# Patient Record
(demographics unavailable — no encounter records)

---

## 2017-08-25 NOTE — EMERGENCY ROOM VISIT NOTE
History


Report prepared by Lucian:  Buzz Sanford


Under the Supervision of:  Dr. Trino Goncalves D.O.


First contact with patient:  22:01


Chief Complaint:  KIDNEY STONE


Stated Complaint:  POSSIBLE KIDNEY STONE





History of Present Illness


The patient is a 25 year old female who presents to the Emergency Room with 

complaints of right flank pain that began 1 week ago. Her pain began as dull, 

but worsened to more of a sharp pain. She rates her pain a 5/10 in severity. 

She has a past medical history of kidney stones and states that her current 

symptoms feel like those of a kidney stone. She is having some burning with 

urination and an abnormal urinary scent as well. She also has a cold with sinus 

congestion and rhinorrhea. She denies any other abnormal symptoms. Her last 

menstrual period was 1 month ago and states that she could be pregnant.





   Source of History:  patient


   Onset:  1 week ago


   Position:  other (Right flank)


   Symptom Intensity:  5/10


   Quality:  sharp


   Timing:  worsening


   Associated Symptoms:  + urinary symptoms


Note:


She has sinus congestion and rhinorrhea. 


She denies any other abnormal symptoms.





Review of Systems


See HPI for pertinent positives & negatives. A total of 10 systems reviewed and 

were otherwise negative.





Past Medical & Surgical


Medical Problems:


(1) Bronchitis


(2) Kidney stones


(3) Ureterolithiasis








Family History





Cancer


Diabetes mellitus


Gallbladder disease


Heart disease


Hypertension


Kidney disease


Kidney stones





Social History


Smoking Status:  Never Smoker


Alcohol Use:  none


Drug Use:  none


Marital Status:  single, in relationship


Housing Status:  lives with family


Occupation Status:  employed





Current/Historical Medications


No Active Prescriptions or Reported Meds





Allergies


Coded Allergies:  


     Diphenhydramine (Verified  Allergy, Intermediate, hives, 8/25/17)





Physical Exam


Vital Signs











  Date Time  Temp Pulse Resp B/P (MAP) Pulse Ox O2 Delivery O2 Flow Rate FiO2


 


8/25/17 21:58 37.0 86 18 137/91 97 Room Air  











Physical Exam


CONSTITUTIONAL/VITAL SIGNS: Reviewed / noted above.


GENERAL: Non-toxic in appearance. 


INTEGUMENTARY: Warm, dry, and Pink.


HEAD: Normocephalic.


EYES: without scleral icterus or trauma.


ENT/OROPHARYNX: clear and moist.


LYMPHADENOPATHY/NECK: Is supple without lymphadenopathy or meningismus.


RESPIRATORY: Lungs clear and equal.


CARDIOVASCULAR: Regular rate and rhythm.


GI/ABDOMEN: Soft and nontender. No organomegaly or pulsatile mass. No rebound 

or guarding. Normal bowel sounds.


EXTREMITIES: Warm and well perfused.


BACK: Right CVA tenderness to palpation. 


NEUROLOGICAL: Intact without focal deficits. 


PSYCHIATRIC: normal affect.


MUSCULOSKELETAL: Normally developed with good muscle tone.





Medical Decision & Procedures


ER Provider


Diagnostic Interpretation:


Radiology results as stated below per my review and radiologist interpretation:





CT ABDOMEN & PELVIS:





Compared to 1/7/16.





A 3 cm right adnexal cystic structure, likely ovarian.


No evidence of hydronephrosis or ureteral stone.


Minimally thickened underdistended bladder.


Normal caliber appendix.


No evidence of bowel obstruction.


Tiny hepatic hypodensity, unchanged.


Small nonspecific mesenteric lymph nodes.





Radiologist:      Marla Sylvester M.D.





Laboratory Results


8/25/17 22:34








Red Blood Count 5.19, Mean Corpuscular Volume 80.3, Mean Corpuscular Hemoglobin 

26.0, Mean Corpuscular Hemoglobin Concent 32.4, Mean Platelet Volume 9.7, 

Neutrophils (%) (Auto) 55.4, Lymphocytes (%) (Auto) 30.3, Monocytes (%) (Auto) 

9.6, Eosinophils (%) (Auto) 4.3, Basophils (%) (Auto) 0.3, Neutrophils # (Auto) 

4.16, Lymphocytes # (Auto) 2.27, Monocytes # (Auto) 0.72, Eosinophils # (Auto) 

0.32, Basophils # (Auto) 0.02





8/25/17 22:34

















Test


  8/25/17


22:08 8/25/17


22:20 8/25/17


22:34


 


Urine Color  YELLOW  


 


Urine Appearance  CLEAR (CLEAR)  


 


Urine pH  5.0 (4.5-7.5)  


 


Urine Specific Gravity


  


  1.027


(1.000-1.030) 


 


 


Urine Protein  NEG (NEG)  


 


Urine Glucose (UA)  NEG (NEG)  


 


Urine Ketones  NEG (NEG)  


 


Urine Occult Blood  NEG (NEG)  


 


Urine Nitrite  NEG (NEG)  


 


Urine Bilirubin  NEG (NEG)  


 


Urine Urobilinogen  NEG (NEG)  


 


Urine Leukocyte Esterase  TRACE (NEG)  


 


Urine WBC (Auto)


  


  5-10 /hpf


(0-5) 


 


 


Urine RBC (Auto)  0-4 /hpf (0-4)  


 


Urine Hyaline Casts (Auto)  0 /lpf (0-5)  


 


Urine Epithelial Cells (Auto)  >30 /lpf (0-5)  


 


Urine Bacteria (Auto)  1+ (NEG)  


 


Urine Pregnancy Test  NEG (NEG)  


 


White Blood Count


  


  


  7.50 K/uL


(4.8-10.8)


 


Red Blood Count


  


  


  5.19 M/uL


(4.2-5.4)


 


Hemoglobin


  


  


  13.5 g/dL


(12.0-16.0)


 


Hematocrit   41.7 % (37-47) 


 


Mean Corpuscular Volume


  


  


  80.3 fL


()


 


Mean Corpuscular Hemoglobin


  


  


  26.0 pg


(25-34)


 


Mean Corpuscular Hemoglobin


Concent 


  


  32.4 g/dl


(32-36)


 


Platelet Count


  


  


  303 K/uL


(130-400)


 


Mean Platelet Volume


  


  


  9.7 fL


(7.4-10.4)


 


Neutrophils (%) (Auto)   55.4 % 


 


Lymphocytes (%) (Auto)   30.3 % 


 


Monocytes (%) (Auto)   9.6 % 


 


Eosinophils (%) (Auto)   4.3 % 


 


Basophils (%) (Auto)   0.3 % 


 


Neutrophils # (Auto)


  


  


  4.16 K/uL


(1.4-6.5)


 


Lymphocytes # (Auto)


  


  


  2.27 K/uL


(1.2-3.4)


 


Monocytes # (Auto)


  


  


  0.72 K/uL


(0.11-0.59)


 


Eosinophils # (Auto)


  


  


  0.32 K/uL


(0-0.5)


 


Basophils # (Auto)


  


  


  0.02 K/uL


(0-0.2)


 


RDW Standard Deviation


  


  


  39.9 fL


(36.4-46.3)


 


RDW Coefficient of Variation


  


  


  13.8 %


(11.5-14.5)


 


Immature Granulocyte % (Auto)   0.1 % 


 


Immature Granulocyte # (Auto)


  


  


  0.01 K/uL


(0.00-0.02)


 


Anion Gap


  


  


  6.0 mmol/L


(3-11)


 


Est Creatinine Clear Calc


Drug Dose 


  


  178.0 ml/min 


 


 


Estimated GFR (


American) 


  


  139.6 


 


 


Estimated GFR (Non-


American 


  


  120.4 


 


 


BUN/Creatinine Ratio   18.1 (10-20) 


 


Calcium Level


  


  


  9.4 mg/dl


(8.5-10.1)


 


Total Bilirubin


  


  


  0.2 mg/dl


(0.2-1)


 


Direct Bilirubin


  


  


  < 0.1 mg/dl


(0-0.2)


 


Aspartate Amino Transf


(AST/SGOT) 


  


  16 U/L (15-37) 


 


 


Alanine Aminotransferase


(ALT/SGPT) 


  


  21 U/L (12-78) 


 


 


Alkaline Phosphatase


  


  


  72 U/L


()


 


Total Protein


  


  


  8.0 gm/dl


(6.4-8.2)


 


Albumin


  


  


  3.8 gm/dl


(3.4-5.0)


 


Lipase


  


  


  110 U/L


()





Laboratory results as stated above per my review.





Medications Administered











 Medications


  (Trade)  Dose


 Ordered  Sig/Dave


 Route  Start Time


 Stop Time Status Last Admin


Dose Admin


 


 Sodium Chloride  1,000 ml @ 


 999 mls/hr  Q1H1M STAT


 IV  8/25/17 22:09


 8/25/17 23:09 DC 8/25/17 22:38


999 MLS/HR


 


 Ketorolac


 Tromethamine


  (Toradol Inj)  30 mg  NOW  STAT


 IV  8/25/17 23:07


 8/25/17 23:08 DC 8/25/17 23:21


30 MG











ED Course


2201: Previous medical records were reviewed. The patient was evaluated in room 

C3. A complete history and physical examination was performed.





2209: Ordered Sodium Chloride 1000 ml @ 999 mls/hr IV





2307: Ordered Toradol Inj 30 mg IV





0000: On reevaluation, the patient is resting. I discussed the results and 

findings with the patient. She verbalized agreement of the treatment plan. She 

was discharged home.





Medical Decision


 Differential considered: pancreatitis, hepatitis, or acute cholecystitis, AAA,

  UTI, pyelonephritis, kidney stones, appendicitis, diverticulitis, shingles, 

bowel obstruction mesenteric ischemia, intussusception, hernia, ovarian torsion

, ruptured ovarian cyst, ectopic pregnancy, pregnancy. 





This is a 25-year-old female who presents to the ED with a chief complaint of 

right sided abdominal pain.  The patient states that his been a dull pain for 

about a week.  It seemed to get a little worse tonight.  She also reports some 

burning with urination.  Last menstrual period was about a month ago.  Her exam 

revealed some mild right CVA tenderness.  CT scan reveals a 3 cm right adnexal 

cyst likely ovarian.  Otherwise was unremarkable.  Urine did not show 

infection.  Pregnancy test was negative.  CBC and PRP were unremarkable.  The 

patient was told the results.  She is felt to be stable for discharge.  She was 

treated with IV Toradol.  She was told take Tylenol or Motrin for pain and 

follow-up with her gynecologist if symptoms persist.





Medication Reconcilliation


Current Medication List:  was personally reviewed by me





Blood Pressure Screening


Patient's blood pressure:  Normal blood pressure


Blood pressure disposition:  Did not require urgent referral





Impression





 Primary Impression:  


 Ovarian cyst





Scribe Attestation


The scribe's documentation has been prepared under my direction and personally 

reviewed by me in its entirety. I confirm that the note above accurately 

reflects all work, treatment, procedures, and medical decision making performed 

by me.





Departure Information


Dispostion


Home / Self-Care





Prescriptions





No Active Prescriptions or Reported Meds





Referrals


No Doctor, Assigned (PCP)





Forms


HOME CARE DOCUMENTATION FORM,                                                 

               IMPORTANT VISIT INFORMATION





Patient Instructions


My [facility]





Additional Instructions





CAT scan suggests an ovarian cyst on the right.





If symptoms persist follow-up with gynecology.





Take Tylenol or Motrin as needed for pain.





Problem Qualifiers








 Primary Impression:  


 Ovarian cyst


 Laterality:  right  Qualified Codes:  N83.201 - Unspecified ovarian cyst, 

right side

## 2017-08-26 NOTE — DIAGNOSTIC IMAGING REPORT
ABD/PELVIS WITHOUT FOR STONE



CLINICAL HISTORY: 25 years-old Female presenting with flank pain. 



TECHNIQUE: Multidetector CT of the abdomen and pelvis was performed without the

use of intravenous contrast. IV contrast: None. A dose lowering technique was

used consistent with the principles of ALARA (as low as reasonably achievable). 



COMPARISON: 1/7/2016.



CT DOSE (mGy.cm): The estimated cumulative dose is 2037.65 mGy.cm.



FINDINGS:



 topogram: Unremarkable.



Lung bases: Lung bases clear. No pericardial or pleural effusion.



Liver: Normal morphology. Normal density. Focal hypodensity in the right hepatic

lobe, indeterminate but likely hepatic cyst or hamartoma.



Biliary: No gross biliary ductal dilatation allowing for noncontrast technique.

Normal gallbladder.



Pancreas: Normal.



Spleen: Normal.



Adrenal glands: Normal.



Kidneys and ureters: Prominent fetal lobulations of the right kidney.

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

Punctate renal calculus at the interpolar region suggested (series 3 image 150).

No hydronephrosis. Ureters grossly normal.



Bladder: Incompletely evaluated secondary to underdistention.



Pelvic organs: Uterus and ovaries normal. Prominent right ovarian cystic lesion

likely dominant follicle, incompletely evaluated.



Bowel: Normal appendix. No bowel obstruction.



Peritoneal cavity: No free fluid or intraperitoneal gas.



Vasculature: Left IVC noted with azygos continuation. Normal noncontrast

appearance of the aorta



Lymph nodes: No gross lymphadenopathy allowing for noncontrast technique.



Abdominal wall: Normal.



Musculoskeletal: Normal.



IMPRESSION:

1.  Punctate nonobstructing renal calculus in the interpolar region of the right

kidney are no hydronephrosis..



2.  No evidence of appendicitis.



3.  Prominent right ovarian cystic lesion likely dominant follicle.











Electronically signed by:  Andrew Dejesus M.D.

8/26/2017 6:38 AM



Dictated Date/Time:  8/26/2017 6:30 AM